# Patient Record
Sex: MALE
[De-identification: names, ages, dates, MRNs, and addresses within clinical notes are randomized per-mention and may not be internally consistent; named-entity substitution may affect disease eponyms.]

---

## 2019-11-11 ENCOUNTER — HOSPITAL ENCOUNTER (OUTPATIENT)
Dept: HOSPITAL 75 - ER | Age: 74
Discharge: ANOTHER HOSPITAL | End: 2019-11-11
Attending: INTERNAL MEDICINE
Payer: MEDICARE

## 2019-11-11 VITALS — BODY MASS INDEX: 26.08 KG/M2 | WEIGHT: 186.29 LBS | HEIGHT: 70.87 IN

## 2019-11-11 VITALS — DIASTOLIC BLOOD PRESSURE: 143 MMHG | SYSTOLIC BLOOD PRESSURE: 196 MMHG

## 2019-11-11 DIAGNOSIS — I21.09: Primary | ICD-10-CM

## 2019-11-11 DIAGNOSIS — E78.5: ICD-10-CM

## 2019-11-11 DIAGNOSIS — I10: ICD-10-CM

## 2019-11-11 DIAGNOSIS — F17.210: ICD-10-CM

## 2019-11-11 DIAGNOSIS — I25.10: ICD-10-CM

## 2019-11-11 LAB
ALBUMIN SERPL-MCNC: 4 GM/DL (ref 3.2–4.5)
ALP SERPL-CCNC: 75 U/L (ref 40–136)
ALT SERPL-CCNC: 23 U/L (ref 0–55)
APTT BLD: 83 SEC (ref 24–35)
BILIRUB SERPL-MCNC: 0.7 MG/DL (ref 0.1–1)
BUN/CREAT SERPL: 8
CALCIUM SERPL-MCNC: 9.2 MG/DL (ref 8.5–10.1)
CHLORIDE SERPL-SCNC: 104 MMOL/L (ref 98–107)
CHOLEST SERPL-MCNC: 201 MG/DL (ref ?–200)
CO2 SERPL-SCNC: 26 MMOL/L (ref 21–32)
CREAT SERPL-MCNC: 1.06 MG/DL (ref 0.6–1.3)
ERYTHROCYTE [DISTWIDTH] IN BLOOD BY AUTOMATED COUNT: 13.7 % (ref 10–14.5)
GFR SERPLBLD BASED ON 1.73 SQ M-ARVRAT: > 60 ML/MIN
GLUCOSE SERPL-MCNC: 108 MG/DL (ref 70–105)
HCT VFR BLD CALC: 44 % (ref 40–54)
HDLC SERPL-MCNC: 34 MG/DL (ref 40–60)
HGB BLD-MCNC: 14.5 G/DL (ref 13.3–17.7)
INR PPP: 1 (ref 0.8–1.4)
MCH RBC QN AUTO: 29 PG (ref 25–34)
MCHC RBC AUTO-ENTMCNC: 33 G/DL (ref 32–36)
MCV RBC AUTO: 88 FL (ref 80–99)
PLATELET # BLD: 243 10^3/UL (ref 130–400)
PMV BLD AUTO: 11.2 FL (ref 7.4–10.4)
POTASSIUM SERPL-SCNC: 4.2 MMOL/L (ref 3.6–5)
PROT SERPL-MCNC: 7.4 GM/DL (ref 6.4–8.2)
PROTHROMBIN TIME: 13.7 SEC (ref 12.2–14.7)
SODIUM SERPL-SCNC: 139 MMOL/L (ref 135–145)
TRIGL SERPL-MCNC: 83 MG/DL (ref ?–150)
VLDLC SERPL CALC-MCNC: 17 MG/DL (ref 5–40)
WBC # BLD AUTO: 5.8 10^3/UL (ref 4.3–11)

## 2019-11-11 PROCEDURE — 85730 THROMBOPLASTIN TIME PARTIAL: CPT

## 2019-11-11 PROCEDURE — 93005 ELECTROCARDIOGRAM TRACING: CPT

## 2019-11-11 PROCEDURE — 80061 LIPID PANEL: CPT

## 2019-11-11 PROCEDURE — 36415 COLL VENOUS BLD VENIPUNCTURE: CPT

## 2019-11-11 PROCEDURE — 85610 PROTHROMBIN TIME: CPT

## 2019-11-11 PROCEDURE — 80053 COMPREHEN METABOLIC PANEL: CPT

## 2019-11-11 PROCEDURE — 93458 L HRT ARTERY/VENTRICLE ANGIO: CPT

## 2019-11-11 PROCEDURE — 33967 INSERT I-AORT PERCUT DEVICE: CPT

## 2019-11-11 PROCEDURE — 85027 COMPLETE CBC AUTOMATED: CPT

## 2019-11-11 NOTE — CARDIAC CATH REPORT
Cardiac Cath Report


Physician (s)/Assistant (s)


Physician


GERALD ANDERSON MD





Pre-Procedure Diagnosis


Pre-Procedure Diagnosis:  Acute myocardial infarct





Post-Procedure Note


Procedure Start Date:  Nov 11, 2019


Name of Procedure:  


Left heart catheterization


Left ventricular gram


Balloon angioplasty to the LAD


Intra-aortic balloon pump placement


Findings/Procedure Note


PROCEDURE NOTE:


74 years old gentleman with history of tobaccoism, hypertension, started to have

acute chest pain, brought to the hospital and noted to have ST elevation in the 

anterior wall.  Brought for emergency cardiac catheterization.


After explaining the procedure to the patient, all pros and cons were explained,

all questions were answered.  The patient signed the consent and then he  was 

placed on the cardiac catheterization laboratory. Groin was prepped SL fashion 

local anesthesia was used. Sheath placed in the artery. John right and left 

catheter were used to access the coronary system. Pigtail was used to access the

left ventricular cavity. 


Left ventriculogram was done





FL guide was used to access left coronary system, patient received 4000 units of

heparin by the EMT, I gave him additional 3000, ACT was 220, I attempted to 

cross the lesion with a BMW wire without success, whisper wire was used and I 

was unable to cross the lesion then I advanced a balloon to the left main and 

used it to support the wire due to the fact that the lesion was ostial LAD.  I 

was able to cross the lesion then proceeded with balloon inflation then I 

upgraded the balloon to 3 x 20, single inflation at 10 marysol with good results 

improvement of the lumen, patient has subtotal occlusion at the ostial LAD that 

improving to moderate to severe disease followed by aneurysmal dilatation.  

Patient had multivessel disease decision was made to transfer him for evaluation

for bypass surgery





The 6 Burkinan sheath was upgraded into 7 Burkinan sheath then intra-aortic balloon 

pump was inserted without complication.


At the end of the procedure the sheath was removed. Closure device





FINDINGS:





Hemodynamics 


/26, end-diastolic pressure 26


Aorta 160/102 mean of 129





ANATOMY:


Left Main has ostial moderate severe stenosis and distal severe stenosis


Left Anterior Descending has ostial subtotal occlusion and severe mid LAD and 

diagonal stenosis, successful balloon angioplasty with door to balloon time 36 

minutes with established of good flow in the LAD using 3 x 20 Emmerge balloon to

the ostial LAD and distal left main


Left Circumflex has moderate to severe ostial lesion and mid circumflex lesion


Right Coronory Artery is dominant artery with moderate severe mid lesion


LV Gram is dilated with diffuse left ventricular hypokinesia more pronounced at 

anterior wall ST ejection fraction 40 percent





intra-aortic balloon pump was placed, angiogram showed good positioning with no 

complication





CONCLUSION:


1.  Acute ST elevation of cardiac infarction and anterior wall with successful 

emergency balloon angioplasty with door to balloon time 36 minutes


2.  Severe ostial LAD stenosis successful balloon angioplasty with improvement 

of the lesion.


3.  Severe multivessel disease including ostial/proximal left main and distal 

left main, ostial LAD, mid LAD, proximal diagonal, ostial circumflex, mid circum

flex and mid right coronary artery stenosis


4.  Dilated left ventricle with diffuse left ventricular hypokinesia estimated 

ejection fraction 40 percent


5.  Successful intra-aortic balloon pump placement





DISCUSSION AND RECOMMENDATION:


Patient will be transferred for evaluation for bypass.  Discussed management 

plan with Dr. Ross in [facility] who accepted the patient


Anesthesia Type:  Conscious Sedation


Estimated blood loss (mL):  25 ml


Contrast Amount:  140 ml


Total Radiation Dose:  1022 mGy





Post-Procedure Diagnosis


Post-operative diagnosis:  


Acute ST elevation myocardial infarction the anterior wall


Coronary artery disease


Hypertension


Hyperlipidemia











GERALD ANDERSON MD              Nov 11, 2019 11:30


POS

## 2019-11-11 NOTE — CARDIOLOGY HISTORY & PHYSICAL
HPI-Cardiology


Cardiology Consultation


Date of Consultation


11/11/19


Date of Admission





Time Seen by Provider:  11:14


Indication:  Acute myocardial infarction





HPI


74 years old gentleman with questionable history of hypertension and 

hyperlipidemia, started to have chest pain last night.  Had sudden onset 

pressure in the retrosternal area, relieved then he started having another epi

sode this morning described as dull achiness in the retrosternal area feeling a 

pressure over his chest with shortness of breath and diaphoresis, called 911 and

he was noted to have acute ST elevation myocardial infarct and anterior wall, he

was brought by ambulance.  Given heparin and nitroglycerin by EMT and reported 

relief of his symptoms.  Still fairly anxious.  No palpitation, no similar 

episodes in the past, has been smoking since he was 12 years old





PMH-Cardiology


Seasonal Allergies


Seasonal Allergies:  No





Surgeries


Yes (cataract)





Respiratory


No





Cardiovascular


No





Neurological


No





Genitourinary


No





Gastrointestinal


No





Musculoskeletal


No





Endocrine


No





HEENT


Yes


Cataract





Cancer


No





Psychosocial


Yes





Other PMHx





Hypertension


Recent cataract surgery





Social History


Patient Social History


Marrital Status:  


Employed/Student:  retired


Alcohol Use:  Denies Use


Recreational Drug Use:  No


Smoking:  Current every day smoker


Recent Foreign Travel:  No


Contact w/other who traveled:  No


Recent Infectious Disease Expo:  No





Family Hx


Other


noncontributory





ROS-Cardiology


Review of Systems


General:  No Chills, No Night Sweats, No Fatigue, No Malaise, No Appetite


HEENT:  No Head Aches, No Visual Changes, No Eye Pain, No Ear Pain, No 

Dysphasia, No Sinus Congestion, No Post Nasal Drip, No Sore Throat


Pulmonary:  Dyspnea; No Cough, No Pleuritic Chest Pain


Cardiovascular:  Chest Pain; No: Palpitations, Orthopnea, Paroxysmal Noc. 

Dyspnea, Edema, Lt Headedness


Gastrointestinal:  No: Nausea, Vomiting, Abdominal Pain, Diarrhea, Constipation,

Melena, Hematochezia


Genitourinary:  No Dysuria, No Frequency, No Incontinence, No Hematuria, No 

Retention


Musculoskeletal:  No: neck pain, shoulder pain, arm pain, back pain, hand pain, 

leg pain, foot pain


Neurological:  No: Weakness, Numbness, Incoordination, Change in speech, Conf

usion, Seizures





Home Medications & Allergies


Home Medication List Reviewed:  Yes





Exam-Cardiology


Vital Signs





Vital Signs








  Date Time  Temp Pulse Resp B/P (MAP) Pulse Ox O2 Delivery O2 Flow Rate FiO2


 


11/11/19 10:31 36.9 96 17 196/143 96 Room Air  











Exam


General Appearance:  Alert, Oriented X3, Cooperative, No Acute Distress


HEENT:  Atraumatic, PERRLA


Respiratory:  Clear to Auscultation, Normal Air Movement


Cardiovascular:  Regular Rate, Normal S1, Normal S2, No Murmurs


Abdominal:  Normal Bowel Sounds, Soft, No Tenderness, No Hepatosplenomegaly, No 

Masses


Extremities:  No Clubbing, No Cyanosis, No Edema, Normal Pulses, No 

Tenderness/Swelling


Skin:  No Rashes, No Breakdown, No Significant Lesion


Neuro:  Normal Gait, Normal Speech, Strength at 5/5 X4 Ext, Normal Tone, 

Sensation Intact


Psych/Mental Status:  Mental Status NL, Mood NL





Results


Labs


Labs


Laboratory Tests


11/11/19 10:05: 








A/P-Cardiology


Admission Diagnosis


Acute ST elevation of cardiac infarction


Coronary artery disease


Hypertension


Hyperlipidemia


Admission Status:  Inpatient Order (span 2 midnights)


Reason for Inpatient Admission:  


Intra-aortic balloon pump placed





Assessment/Plan


Acute ST elevation with cardioversion the anterior wall, planning to proceed 

with emergency cardiac catheterization 





Coronary artery disease, cardiac catheterization carried out showing severe 

multivessel disease, underwent balloon angioplasty to the ostial/proximal LAD, 

door to balloon time 36 minutes then intra-aortic balloon pump was placed, I am 

planning to transfer him for evaluation for bypass surgery





Hypertension, stabilized blood pressure





Chest pain, reporting improvement





Recent cataract surgery





Tobaccoism, educated on smoking cessation





Hospital course:


Patient was admitted directly to the catheter lab from the emergency room I 

proceeded with emergency cardiac catheterization and balloon angioplasty to the 

ostial/proximal LAD, established good lumen.  Patient has mid LAD and diagonal 

lesion, ostial circumflex and mid circumflex lesion and midright coronary artery

lesion, ejection fraction 40 percent, I'll transfer him for evaluation for 

bypass surgery.





Final diagnosis


Acute ST elevation myocardial infarction and anterior wall


Coronary artery disease


Hypertension


Hyperlipidemia





Clinical Quality Measures


AMI/AHF:


ASA po Prior to arrival:  Yes











GERALD ANDERSON MD              Nov 11, 2019 11:19


POS

## 2019-11-11 NOTE — NUR
Consent verbally obtianed from patient for patient to be taken to cath lab. 
Patient consents and witnessed by Aria TENA.

## 2019-11-11 NOTE — CARDIAC PROCEDURE NOTE-CS/ASA
Pre-Procedure Note


Pre-Op Procedure Note


H&P Reviewed


The H&P was reviewed, patient examined and no changes noted.


Date H&P Reviewed:  Nov 11, 2019


Time H&P Reviewed:  10:00





Conscious Sedation Pre-Proced


Time


10:00





ASA Score


3


For ASA 3 and 4: Consider anesthesia and medical clearance. Also, for patients

with a history of failed moderate sedation consider anesthesia.

















Airway 


 


Lungs 


 


Heart 


 


 ASA score


 


 ASA 1: a normal healthy patient


 


 ASA 2:  a patient with a mild systemic disease (mid diabetes, controlled 

hypertension, obesity 


 


x ASA 3:  a patient with a severe systemic disease that limits activity  

(angina, COPD, prior Myocardial infarction)


 


 ASA 4:  a patient with an incapacitating disease that is a constant threat to 

life (CHF, renal failure)


 


 ASA 5:  a moribund patient not expected to survive 24 hrs.  (ruptured aneurysm)


 


 ASA 6:  a declared brain-dead patient whose organs are being harvested.


 


 For emergent operations, add the letter E after the classification











Mallampati Classification


Grade 3





Sedation Plan


Analgesia, Amnesia, Plan communicated to team members, Discussed options with 

patient/fam, Discussed risks with patient/fam


The patient is an appropriate candidate to undergo the planned procedure, 

sedation, and anesthesia.





The patient immediately re-assessed prior to indication.











GERALD ANDERSON MD              Nov 11, 2019 11:14


POS